# Patient Record
Sex: FEMALE | Race: OTHER
[De-identification: names, ages, dates, MRNs, and addresses within clinical notes are randomized per-mention and may not be internally consistent; named-entity substitution may affect disease eponyms.]

---

## 2017-03-17 ENCOUNTER — HOSPITAL ENCOUNTER (EMERGENCY)
Dept: HOSPITAL 61 - ER | Age: 32
Discharge: HOME | End: 2017-03-17
Payer: COMMERCIAL

## 2017-03-17 VITALS
SYSTOLIC BLOOD PRESSURE: 128 MMHG | DIASTOLIC BLOOD PRESSURE: 92 MMHG | SYSTOLIC BLOOD PRESSURE: 128 MMHG | DIASTOLIC BLOOD PRESSURE: 92 MMHG

## 2017-03-17 VITALS — BODY MASS INDEX: 27.64 KG/M2 | WEIGHT: 156 LBS | HEIGHT: 63 IN

## 2017-03-17 DIAGNOSIS — N76.0: ICD-10-CM

## 2017-03-17 DIAGNOSIS — N30.00: Primary | ICD-10-CM

## 2017-03-17 LAB
BACTERIA #/AREA URNS HPF: (no result) /HPF
BILIRUB UR QL STRIP: NEGATIVE
GLUCOSE UR STRIP-MCNC: NEGATIVE MG/DL
NITRITE UR QL STRIP: NEGATIVE
PH UR STRIP: 6.5 [PH]
PROT UR STRIP-MCNC: 100 MG/DL
RBC #/AREA URNS HPF: (no result) /HPF (ref 0–2)
SP GR UR STRIP: 1.02
SQUAMOUS #/AREA URNS LPF: (no result) /LPF
UROBILINOGEN UR-MCNC: 0.2 MG/DL
WBC #/AREA URNS HPF: (no result) /HPF (ref 0–4)

## 2017-03-17 PROCEDURE — 87491 CHLMYD TRACH DNA AMP PROBE: CPT

## 2017-03-17 PROCEDURE — 87591 N.GONORRHOEAE DNA AMP PROB: CPT

## 2017-03-17 PROCEDURE — 81001 URINALYSIS AUTO W/SCOPE: CPT

## 2017-03-17 PROCEDURE — 99285 EMERGENCY DEPT VISIT HI MDM: CPT

## 2017-03-17 PROCEDURE — 81025 URINE PREGNANCY TEST: CPT

## 2017-03-17 PROCEDURE — 76857 US EXAM PELVIC LIMITED: CPT

## 2017-03-17 NOTE — PHYS DOC
Past Medical History


Past Medical History:  No Pertinent History


Past Surgical History:  , Other


Additional Past Surgical Histo:  BREAST SURG


Alcohol Use:  None


Drug Use:  None





Adult General


Chief Complaint


Chief Complaint:  VAGINAL PROBLEM





[facility]


HPI


Patient is a 32  year old female who presents stating yesterday she noted she 

has extra vaginal tissue hanging around the vagina, patient denies any concern 

for STDs. She states she also noted increased vaginal discharge since 

yesterday. Patient denies any chance she is pregnant. Denies any urgency 

frequency or dysuria. She states she has an appointment with her OB/GYN on 

Thursday next week. 





Patient is Emirati-speaking and interpretation was provided by the 





Review of Systems


Review of Systems





Constitutional: Denies fever or chills []


Eyes: Denies change in visual acuity, redness, or eye pain []


HENT: Denies nasal congestion or sore throat []


Respiratory: Denies cough or shortness of breath []


Cardiovascular: No additional information not addressed in HPI []


GI: Denies abdominal pain, nausea, vomiting, bloody stools or diarrhea []


: extra vaginal tissue hanging around the vagina


Musculoskeletal: Denies back pain or joint pain []


Integument: Denies rash or skin lesions []


Neurologic: Denies headache, focal weakness or sensory changes []


Endocrine: Denies polyuria or polydipsia []





Allergies


Allergies





 Allergies








Coded Allergies Type Severity Reaction Last Updated Verified


 


  No Known Drug Allergies    14 No











Physical Exam


Physical Exam





Constitutional: Well developed, well nourished, no acute distress, non-toxic 

appearance. []


HENT: Normocephalic, atraumatic, bilateral external ears normal, oropharynx 

moist, no oral exudates, nose normal. []


Eyes: PERRLA, EOMI, conjunctiva normal, no discharge. [] 


Neck: Normal range of motion, no tenderness, supple, no stridor. [] 


Cardiovascular:Heart rate regular rhythm, no murmur []


Lungs & Thorax:  Bilateral breath sounds clear to auscultation []


Abdomen: Bowel sounds normal, soft, no tenderness, no masses, no pulsatile 

masses. [] 


Pelvic exam


Lower aspect of the vaginal opening has mild amount of extra tissue with the 

redness, the tissue is not draining anything. The tissue is not warm or tender 

to touch.


Cervix is closed no CMT, no adnexal tenderness, small amount of yellow 

discharge in the vaginal vault. No bleeding


Skin: Warm, dry, no erythema, no rash. [] 


Back: No tenderness, no CVA tenderness. [] 


Extremities: No tenderness, no cyanosis, no clubbing, ROM intact, no edema. [] 


Neurologic: Alert and oriented X 3, normal motor function, normal sensory 

function, no focal deficits noted. []


Psychologic: Affect normal, judgement normal, mood normal. []





Current Patient Data


Vital Signs





 Vital Signs








  Date Time  Temp Pulse Resp B/P Pulse Ox O2 Delivery O2 Flow Rate FiO2


 


3/17/17 11:12 97.5 92 16 128/92 99 Room Air  





 97.5       








Lab Values





 Laboratory Tests








Test


  3/17/17


11:10 3/17/17


12:00


 


POC Urine HCG, Qualitative


  Hcg negative


(Negative) 


 


 


Urine Collection Type  Void  


 


Urine Color  Yellow  


 


Urine Clarity  Turbid  


 


Urine pH  6.5  


 


Urine Specific Gravity  1.025  


 


Urine Protein


  


  100mg/dL


(NEG-TRACE)


 


Urine Glucose (UA)


  


  Negativemg/dL


(NEG)


 


Urine Ketones (Stick)


  


  Negativemg/dL


(NEG)


 


Urine Blood


  


  Moderate (NEG)


 


 


Urine Nitrite


  


  Negative (NEG)


 


 


Urine Bilirubin


  


  Negative (NEG)


 


 


Urine Urobilinogen Dipstick


  


  0.2mg/dL (0.2


mg/dL)


 


Urine Leukocyte Esterase  Large (NEG)  


 


Urine RBC  1-2/HPF (0-2)  


 


Urine WBC


  


  Tntc/HPF (0-4)


 


 


Urine Squamous Epithelial


Cells 


  Many/LPF  


 


 


Urine Bacteria


  


  Many/HPF


(0-FEW)


 


Urine Mucus  Marked/LPF  








Microbiology


3/17/17 Wet Prep - Final, Complete


          








EKG


EKG


[]





Radiology/Procedures


Radiology/Procedures


[]





Course & Med Decision Making


Course & Med Decision Making


Pertinent Labs and Imaging studies reviewed. (See chart for details)





Patient is in the ED complaining of external vaginal tissue that she noted on 

her vaginal wall. On physical exam she does have extra erythematous tissue on 

the lower aspect of the vaginal opening. Wet prep is negative for any acute 

findings. Urine analysis is positive for infection. Discharged with Keflex. She 

has an appointment with Dr. Martinez her OB/GYN on Thursday.





Dragon Disclaimer


Dragon Disclaimer


This electronic medical record was generated, in whole or in part, using a 

voice recognition dictation system.





Departure


Departure


Impression:  


 Primary Impression:  


 Urinary tract infection


 Additional Impression:  


 Vaginal infection


Disposition:   HOME, SELF-CARE


Condition:  STABLE


Referrals:  


NO PCP (PCP)








ART MARTINEZ MD


Follow up on Thursday as scheduled


Patient Instructions:  Skin Infections





Additional Instructions:


You have skin infection on the vaginal wall and urinary tract infection. Please 

complete your antibiotics. See Dr. Rodriguez next Thursday


Scripts


Cephalexin 500 Mg Tablet1 Tab PO BID #14 TAB


   Prov:KELSI MCFARLAND         3/17/17





Problem Qualifiers








 Primary Impression:  


 Urinary tract infection


 Urinary tract infection type:  acute cystitis  Hematuria presence:  without 

hematuria  Qualified Code:  N30.00 - Acute cystitis without hematuria





KELSI MCFARLAND Mar 17, 2017 12:36

## 2019-08-28 ENCOUNTER — HOSPITAL ENCOUNTER (OUTPATIENT)
Dept: HOSPITAL 61 - KCIC US | Age: 34
Discharge: HOME | End: 2019-08-28
Attending: OBSTETRICS & GYNECOLOGY
Payer: COMMERCIAL

## 2019-08-28 DIAGNOSIS — N93.9: Primary | ICD-10-CM

## 2019-08-28 PROCEDURE — 76830 TRANSVAGINAL US NON-OB: CPT

## 2019-08-28 PROCEDURE — 76856 US EXAM PELVIC COMPLETE: CPT

## 2019-08-28 NOTE — KCIC
Examination: Ultrasound pelvis

 

HISTORY: History of abnormal uterine bleeding

 

COMPARISON: 3/17/2017.

 

FINDINGS:

 

The uterus measures 8.4 x 5.6 x 3.4 cm. Endometrium measures 9.7 mm in 

thickness. The right ovary measures 2.6 x 1.8 x 1.4 cm. The left ovary 

measures 2.7 x 1.5 x 1.6 cm. Blood flow identified in the right and left 

ovaries.

 

IMPRESSION:

 

1. Endometrium measures 9.7 mm which is mildly thickened for this stage of

menstrual cycle, given patient's recent LMP.

 

Electronically signed by: Wood Rojas MD (8/28/2019 3:24 PM) [place] [id number]

## 2020-08-05 ENCOUNTER — HOSPITAL ENCOUNTER (EMERGENCY)
Dept: HOSPITAL 61 - ER | Age: 35
Discharge: HOME | End: 2020-08-05
Payer: COMMERCIAL

## 2020-08-05 VITALS — WEIGHT: 154.32 LBS | HEIGHT: 64 IN | BODY MASS INDEX: 26.35 KG/M2

## 2020-08-05 VITALS
SYSTOLIC BLOOD PRESSURE: 108 MMHG | DIASTOLIC BLOOD PRESSURE: 70 MMHG | DIASTOLIC BLOOD PRESSURE: 70 MMHG | SYSTOLIC BLOOD PRESSURE: 108 MMHG

## 2020-08-05 DIAGNOSIS — O20.0: Primary | ICD-10-CM

## 2020-08-05 DIAGNOSIS — Z98.890: ICD-10-CM

## 2020-08-05 DIAGNOSIS — Z3A.01: ICD-10-CM

## 2020-08-05 LAB
ANION GAP SERPL CALC-SCNC: 9 MMOL/L (ref 6–14)
BASOPHILS # BLD AUTO: 0.1 X10^3/UL (ref 0–0.2)
BASOPHILS NFR BLD: 1 % (ref 0–3)
BUN SERPL-MCNC: 10 MG/DL (ref 7–20)
CALCIUM SERPL-MCNC: 8.5 MG/DL (ref 8.5–10.1)
CHLORIDE SERPL-SCNC: 104 MMOL/L (ref 98–107)
CO2 SERPL-SCNC: 24 MMOL/L (ref 21–32)
CREAT SERPL-MCNC: 0.6 MG/DL (ref 0.6–1)
EOSINOPHIL NFR BLD: 0.2 X10^3/UL (ref 0–0.7)
EOSINOPHIL NFR BLD: 3 % (ref 0–3)
ERYTHROCYTE [DISTWIDTH] IN BLOOD BY AUTOMATED COUNT: 13.3 % (ref 11.5–14.5)
GFR SERPLBLD BASED ON 1.73 SQ M-ARVRAT: 114.4 ML/MIN
GLUCOSE SERPL-MCNC: 110 MG/DL (ref 70–99)
HCT VFR BLD CALC: 39.8 % (ref 36–47)
HGB BLD-MCNC: 13.9 G/DL (ref 12–15.5)
LYMPHOCYTES # BLD: 1.9 X10^3/UL (ref 1–4.8)
LYMPHOCYTES NFR BLD AUTO: 27 % (ref 24–48)
MCH RBC QN AUTO: 30 PG (ref 25–35)
MCHC RBC AUTO-ENTMCNC: 35 G/DL (ref 31–37)
MCV RBC AUTO: 86 FL (ref 79–100)
MONO #: 0.4 X10^3/UL (ref 0–1.1)
MONOCYTES NFR BLD: 6 % (ref 0–9)
NEUT #: 4.6 X10^3/UL (ref 1.8–7.7)
NEUTROPHILS NFR BLD AUTO: 64 % (ref 31–73)
PLATELET # BLD AUTO: 327 X10^3/UL (ref 140–400)
POTASSIUM SERPL-SCNC: 3.9 MMOL/L (ref 3.5–5.1)
RBC # BLD AUTO: 4.64 X10^6/UL (ref 3.5–5.4)
SODIUM SERPL-SCNC: 137 MMOL/L (ref 136–145)
WBC # BLD AUTO: 7.2 X10^3/UL (ref 4–11)

## 2020-08-05 PROCEDURE — 36415 COLL VENOUS BLD VENIPUNCTURE: CPT

## 2020-08-05 PROCEDURE — 81025 URINE PREGNANCY TEST: CPT

## 2020-08-05 PROCEDURE — 84702 CHORIONIC GONADOTROPIN TEST: CPT

## 2020-08-05 PROCEDURE — 86901 BLOOD TYPING SEROLOGIC RH(D): CPT

## 2020-08-05 PROCEDURE — 80048 BASIC METABOLIC PNL TOTAL CA: CPT

## 2020-08-05 PROCEDURE — 86900 BLOOD TYPING SEROLOGIC ABO: CPT

## 2020-08-05 PROCEDURE — 85025 COMPLETE CBC W/AUTO DIFF WBC: CPT

## 2020-08-05 PROCEDURE — 76817 TRANSVAGINAL US OBSTETRIC: CPT

## 2020-08-05 PROCEDURE — 99285 EMERGENCY DEPT VISIT HI MDM: CPT

## 2020-08-05 PROCEDURE — 87591 N.GONORRHOEAE DNA AMP PROB: CPT

## 2020-08-05 PROCEDURE — 76801 OB US < 14 WKS SINGLE FETUS: CPT

## 2020-08-05 PROCEDURE — 86850 RBC ANTIBODY SCREEN: CPT

## 2020-08-05 PROCEDURE — 87491 CHLMYD TRACH DNA AMP PROBE: CPT

## 2020-08-05 RX ADMIN — ACETAMINOPHEN ONE MG: 500 TABLET ORAL at 10:14

## 2020-08-05 NOTE — RAD
OB <14 WKS W/TV

 

History: IUP, vaginal bleeding

 

Comparison: None.

 

Findings:

Multiple transabdominal sonographic images of the pelvis are submitted. 

Uterus measures about 6.8 x 3.2 x 4.8 cm.

 

Transvaginal ultrasound: Multiple transvaginal sonographic images of the 

pelvis are submitted. There is a single intrauterine gestational sac with 

identifiable yolk sac and fetal pole. Gestational sac morphology is within

normal limits. There is demonstrable fetal cardiac activity 133 bpm. 

Amniotic fluid volume is within normal limits. Fetal anatomy and placenta 

are not well visualized at this age of the pregnancy. Crown-rump length 

measurement of 0.44 cm corresponds with 6 weeks 1 day. Adjusted ultrasound

age is 6 weeks 1 day with estimated delivery date of 3/30/2021. LMP age is

6 weeks 1 day with estimated delivery date of 3/30/2021.

 

Right ovary measured 3.2 x 2.1 x 2 cm. There is a hypoechoic lesion of the

right ovary about 1.2 cm. There is normal low resistance vascularity of 

the right ovary. Left ovary measured 2.5 x 1 x 0.9 cm, normal low 

resistance vascularity.

 

Impression: 

 

1.  There is a single viable intrauterine pregnancy with demonstrable 

fetal cardiac activity, adjusted ultrasound age 6 weeks 1 day with 

estimated delivery date of 3/30/2021.

2. There is a hypoechoic cyst of the right ovary which may be corpus 

luteal cyst.

 

Electronically signed by: Tre Barry MD (8/5/2020 10:48 AM) [id number]

## 2020-08-05 NOTE — PHYS DOC
Past Medical History


Past Medical History:  No Pertinent History


Past Surgical History:  , Other


Additional Past Surgical Histo:  BREAST SURG


Smoking Status:  Never Smoker


Alcohol Use:  None


Drug Use:  None





General Adult


EDM:


Chief Complaint:  VAGINAL BLEEDING PREGNANCY





HPI:


HPI:





The history was obtained from the patient.  Patient is a 35-year-old  

female with no reported PMH who presents with a chief complaint of vaginal 

bleeding.  Patient states she woke up this morning approximately 3 hours prior 

to arrival noted blood in her underwear.  She states it is slightly less than 

her normal period amount.  She denies any clot passage.  She denies any abdomina

l pain.  She denies any lightheadedness or syncope.  She states that she has not

had prenatal care to this point.  She states the first day of her last menstrual

period was .  She estimates she is 7 weeks pregnant.  She states that she

actually scheduled an appointment with OB/GYN at 2:00 this afternoon but wanted 

to be seen sooner.  Denies urinary symptoms.  Denies any other vaginal 

discharge.  Denies history of ectopic pregnancy.  No other complaints.





Review of Systems:


Review of Systems:


Constitutional:   Denies fever or chills. []


Eyes:   Denies change in visual acuity. []


HENT:   Denies nasal congestion or sore throat. [] 


Respiratory:   Denies cough or shortness of breath. [] 


Cardiovascular:   Denies chest pain or edema. [] 


GI:   Denies abdominal pain, nausea, vomiting, bloody stools or diarrhea. [] 


: Positive for vaginal bleeding


Musculoskeletal:   Denies back pain or joint pain. [] 


Integument:   Denies rash. [] 


Neurologic:   Denies headache, focal weakness or sensory changes. [] 


Endocrine:   Denies polyuria or polydipsia. [] 


Lymphatic:  Denies swollen glands. [] 


Psychiatric:  Denies depression or anxiety. []





Heart Score:


Risk Factors:


Risk Factors:  DM, Current or recent (<one month) smoker, HTN, HLP, family 

history of CAD, obesity.


Risk Scores:


Score 0 - 3:  2.5% MACE over next 6 weeks - Discharge Home


Score 4 - 6:  20.3% MACE over next 6 weeks - Admit for Clinical Observation


Score 7 - 10:  72.7% MACE over next 6 weeks - Early Invasive Strategies





Current Medications:





Current Medications








 Medications


  (Trade)  Dose


 Ordered  Sig/Eri  Start Time


 Stop Time Status Last Admin


Dose Admin


 


 Acetaminophen


  (Tylenol)  1,000 mg  1X  ONCE  20 09:45


 20 09:46 DC  














Allergies:


Allergies:





Allergies








Coded Allergies Type Severity Reaction Last Updated Verified


 


  No Known Drug Allergies    14 No











Physical Exam:


PE:





Constitutional: Well developed, well nourished, no acute distress, non-toxic 

appearance. []


HENT: Normocephalic, atraumatic, bilateral external ears normal, oropharynx 

moist, no oral exudates, nose normal. []


Eyes: PERRLA, EOMI, conjunctiva normal, no discharge. [] 


Neck: Normal range of motion, no tenderness, supple, no stridor. [] 


Cardiovascular:Heart rate regular rhythm, no murmur []


Lungs & Thorax:  Bilateral breath sounds clear to auscultation []


Abdomen: Soft, nontender, nonacute abdomen.  No involuntary guarding or rigidity

 noted.  No acute peritonitis.


Pelvic: Chaperoned by GWYN Alcala.  Scant amount of mucousy bloody discharge.  

No active bleeding noted.  No fetal parts visualized.  No clots noted.


Skin: Warm, dry, no erythema, no rash. [] 


Back: No tenderness, no CVA tenderness. [] 


Extremities: No tenderness, no cyanosis, no clubbing, ROM intact, no edema. [] 


Neurologic: Alert and oriented X 3, normal motor function, normal sensory 

function, no focal deficits noted. []


Psychologic: Affect normal, judgement normal, mood normal. []





Current Patient Data:


Labs:





Laboratory Tests








Test


 20


09:37 20


09:49


 


Bedside Urine HCG, Qualitative Hcg positive  


 


White Blood Count  7.2 x10^3/uL 


 


Red Blood Count  4.64 x10^6/uL 


 


Hemoglobin  13.9 g/dL 


 


Hematocrit  39.8 % 


 


Mean Corpuscular Volume  86 fL 


 


Mean Corpuscular Hemoglobin  30 pg 


 


Mean Corpuscular Hemoglobin


Concent 


 35 g/dL 





 


Red Cell Distribution Width  13.3 % 


 


Platelet Count  327 x10^3/uL 


 


Neutrophils (%) (Auto)  64 % 


 


Lymphocytes (%) (Auto)  27 % 


 


Monocytes (%) (Auto)  6 % 


 


Eosinophils (%) (Auto)  3 % 


 


Basophils (%) (Auto)  1 % 


 


Neutrophils # (Auto)  4.6 x10^3/uL 


 


Lymphocytes # (Auto)  1.9 x10^3/uL 


 


Monocytes # (Auto)  0.4 x10^3/uL 


 


Eosinophils # (Auto)  0.2 x10^3/uL 


 


Basophils # (Auto)  0.1 x10^3/uL 


 


Maternal Serum HCG Beta


Subunit 


 34789 mIU/mL 





 


Sodium Level  137 mmol/L 


 


Potassium Level  3.9 mmol/L 


 


Chloride Level  104 mmol/L 


 


Carbon Dioxide Level  24 mmol/L 


 


Anion Gap  9 


 


Blood Urea Nitrogen  10 mg/dL 


 


Creatinine  0.6 mg/dL 


 


Estimated GFR


(Cockcroft-Gault) 


 114.4 





 


Glucose Level  110 mg/dL 


 


Calcium Level  8.5 mg/dL 








Current Medications








 Medications


  (Trade)  Dose


 Ordered  Sig/Eri


 Route


 PRN Reason  Start Time


 Stop Time Status Last Admin


Dose Admin


 


 Acetaminophen


  (Tylenol)  1,000 mg  1X  ONCE


 PO


   20 09:45


 20 09:46 DC 20 10:14











                                Laboratory Tests








Test


 20


09:37


 


POC Urine HCG, Qualitative


 Hcg positive


(Negative)











EKG:


EKG:


[]





Radiology/Procedures:


Radiology/Procedures:


[][facility]


                    [address] [place] [address]


                                 [phone]


                                        


                                 IMAGING REPORT





                                     Signed





PATIENT: CARA ALFARO AACCOUNT: [id number]     MRN#: [id number]


: 1985           LOCATION: ER              AGE: 35


SEX: F                    EXAM DT: 20         ACCESSION#: [id number]


STATUS: REG ER            ORD. PHYSICIAN: DIMA PANCHAL DO


REASON: confirm IUP. vag bleed


PROCEDURE: OB <14 WKS W/TV





OB <14 WKS W/TV


 


History: IUP, vaginal bleeding


 


Comparison: None.


 


Findings:


Multiple transabdominal sonographic images of the pelvis are submitted. 


Uterus measures about 6.8 x 3.2 x 4.8 cm.


 


Transvaginal ultrasound: Multiple transvaginal sonographic images of the 


pelvis are submitted. There is a single intrauterine gestational sac with 


identifiable yolk sac and fetal pole. Gestational sac morphology is within


normal limits. There is demonstrable fetal cardiac activity 133 bpm. 


Amniotic fluid volume is within normal limits. Fetal anatomy and placenta 


are not well visualized at this age of the pregnancy. Crown-rump length 


measurement of 0.44 cm corresponds with 6 weeks 1 day. Adjusted ultrasound


age is 6 weeks 1 day with estimated delivery date of 3/30/2021. LMP age is


6 weeks 1 day with estimated delivery date of 3/30/2021.


 


Right ovary measured 3.2 x 2.1 x 2 cm. There is a hypoechoic lesion of the


right ovary about 1.2 cm. There is normal low resistance vascularity of 


the right ovary. Left ovary measured 2.5 x 1 x 0.9 cm, normal low 


resistance vascularity.


 


Impression: 


 


1.  There is a single viable intrauterine pregnancy with demonstrable 


fetal cardiac activity, adjusted ultrasound age 6 weeks 1 day with 


estimated delivery date of 3/30/2021.


2. There is a hypoechoic cyst of the right ovary which may be corpus 


luteal cyst.


 


Electronically signed by: Fransisco Aquino MD (2020 10:48 AM) [id number]














DICTATED and SIGNED BY:     FRANSISCO AQUINO MD


DATE:     20 1048





Course & Med Decision Making:


Course & Med Decision Making


Pertinent Labs and Imaging studies reviewed. (See chart for details)





Patient is a 35-year-old G3, P2 female who presents with chief complaint of 

vaginal burning associated with pregnancy.  Ultrasound does reveal intrauterine 

pregnancy.  Estimated gestational age 6 weeks 1 day.  Fetal heart tones noted.  

Blood type B+ therefore RhoGam be deferred.  Remainder of work-up unremarkable. 

 I did discuss results of labs and imaging with the patient and father at 

bedside.  Patient has had appoint with her OB/GYN in 3 hours.  I do feel she is 

appropriate for discharge home.  Return precautions discussed and understood.  

Stable for discharge.





Dragon Disclaimer:


Dragon Disclaimer:


This electronic medical record was generated, in whole or in part, using a voice

 recognition dictation system.





Departure


Departure


Impression:  


   Primary Impression:  


   Threatened 


Disposition:  01 HOME, SELF-CARE


Condition:  GOOD


Referrals:  


NO PCP (PCP)


Patient Instructions:  Threatened Miscarriage





Additional Instructions:  


Please follow-up with your scheduled OB/GYN appointment in the next 3 hours.





Justicifation of Admission Dx:


Justifications for Admission:


Justification of Admission Dx:  N/A











DIMA PANCHAL DO           Aug 5, 2020 09:50

## 2021-01-13 ENCOUNTER — HOSPITAL ENCOUNTER (OUTPATIENT)
Dept: HOSPITAL 61 - LAB | Age: 36
End: 2021-01-13
Attending: OBSTETRICS & GYNECOLOGY
Payer: MEDICAID

## 2021-01-13 DIAGNOSIS — Z3A.27: ICD-10-CM

## 2021-01-13 DIAGNOSIS — O09.92: Primary | ICD-10-CM

## 2021-01-13 PROCEDURE — 82947 ASSAY GLUCOSE BLOOD QUANT: CPT

## 2021-01-13 PROCEDURE — 82950 GLUCOSE TEST: CPT

## 2021-01-13 PROCEDURE — 36415 COLL VENOUS BLD VENIPUNCTURE: CPT

## 2021-11-24 ENCOUNTER — HOSPITAL ENCOUNTER (EMERGENCY)
Dept: HOSPITAL 61 - ER | Age: 36
Discharge: HOME | End: 2021-11-24
Payer: MEDICAID

## 2021-11-24 VITALS — BODY MASS INDEX: 27.18 KG/M2 | WEIGHT: 147.71 LBS | HEIGHT: 62 IN

## 2021-11-24 VITALS — SYSTOLIC BLOOD PRESSURE: 121 MMHG | DIASTOLIC BLOOD PRESSURE: 81 MMHG

## 2021-11-24 DIAGNOSIS — Z98.890: ICD-10-CM

## 2021-11-24 DIAGNOSIS — M54.50: Primary | ICD-10-CM

## 2021-11-24 LAB
APTT PPP: YELLOW S
BACTERIA #/AREA URNS HPF: (no result) /HPF
BILIRUB UR QL STRIP: NEGATIVE
FIBRINOGEN PPP-MCNC: CLEAR MG/DL
NITRITE UR QL STRIP: NEGATIVE
PH UR STRIP: 7.5 [PH]
PROT UR STRIP-MCNC: NEGATIVE MG/DL
RBC #/AREA URNS HPF: (no result) /HPF (ref 0–2)
UROBILINOGEN UR-MCNC: 1 MG/DL
WBC #/AREA URNS HPF: (no result) /HPF (ref 0–4)

## 2021-11-24 PROCEDURE — 81025 URINE PREGNANCY TEST: CPT

## 2021-11-24 PROCEDURE — 81001 URINALYSIS AUTO W/SCOPE: CPT

## 2021-11-24 PROCEDURE — 99285 EMERGENCY DEPT VISIT HI MDM: CPT

## 2021-11-24 NOTE — PHYS DOC
Past Medical History


Past Medical History:  No Pertinent History


 (ANDREW MORGAN)


Past Surgical History:  , Other


Additional Past Surgical Histo:  BREAST SURG


 (ANDREW MORGAN)


Smoking Status:  Never Smoker


Alcohol Use:  None


Drug Use:  None


 (ANDREW MORGAN)





General Adult


EDM:


Chief Complaint:  BACK PAIN - NO INJURY





HPI:


HPI:





Patient is a 36-year-old female that presents today with low back pain.  Patient

is Portuguese-speaking only so interpretive services have been used for this exam. 

Patient states her pain started 3 days ago, she denies any trauma.  Patient 

states that she had the same pain about 2 years ago and was diagnosed with 

kidney or bladder infection, and was treated, she was told also to follow-up 

with a kidney specialist she stated that her pain went away and she never 

followed up after that.  Patient is sitting crosslegged on the bed in no acute 

distress.  Patient denies fever or chills, vaginal bleeding vaginal discharge.  

Patient states that her last menstrual cycle was approximately 2 weeks ago she 

states it was normal for her except for she did have some increased cramping at 

the start of her period. 


 (ANDREW MORGAN APRANA LUISA)





Review of Systems:


Review of Systems:


Constitutional:   Denies fever or chills. []


Eyes:   Denies change in visual acuity. []


HENT:   Denies nasal congestion or sore throat. [] 


Respiratory:   Denies cough or shortness of breath. [] 


Cardiovascular:   Denies chest pain or edema. [] 


GI:   Denies abdominal pain, nausea, vomiting, bloody stools or diarrhea. [] 


:  Denies dysuria. [] 


Musculoskeletal: Low back pain


Integument:   Denies rash. [] 


Neurologic:   Denies headache, focal weakness or sensory changes. [] 


Endocrine:   Denies polyuria or polydipsia. [] 


Lymphatic:  Denies swollen glands. [] 


Psychiatric:  Denies depression or anxiety. []


 (ANDREW MORGAN APRANA LUISA)





Heart Score:


C/O Chest Pain:  N/A


Risk Factors:


Risk Factors:  DM, Current or recent (<one month) smoker, HTN, HLP, family 

history of CAD, obesity.


Risk Scores:


Score 0 - 3:  2.5% MACE over next 6 weeks - Discharge Home


Score 4 - 6:  20.3% MACE over next 6 weeks - Admit for Clinical Observation


Score 7 - 10:  72.7% MACE over next 6 weeks - Early Invasive Strategies


 (ANDREW MORGAN)





Allergies:


Allergies:





Allergies








Coded Allergies Type Severity Reaction Last Updated Verified


 


  No Known Drug Allergies    14 No








 (ANDREW MORGAN)





Physical Exam:


PE:





Constitutional: Well developed, well nourished, no acute distress, non-toxic a

ppearance. []


HENT: Normocephalic, atraumatic, bilateral external ears normal, oropharynx 

moist, no oral exudates, nose normal. []


Eyes: PERRLA, EOMI, conjunctiva normal, no discharge. [] 


Neck: Normal range of motion, no tenderness, supple, no stridor. [] 


Cardiovascular:Heart rate regular rhythm, no murmur []


Lungs & Thorax:  Bilateral breath sounds clear to auscultation []


Abdomen: Bowel sounds normal, soft, no tenderness, no masses, no pulsatile 

masses. [] 


Skin: Warm, dry, no erythema, no rash. [] 


Back: Low back pain with tenderness with palpation, no radiculopathy 

neurovascular intact distal to the pain, patient ambulates without difficulty


Extremities: No tenderness, no cyanosis, no clubbing, ROM intact, no edema. [] 


Neurologic: Alert and oriented X 3, normal motor function, normal sensory func

tion, no focal deficits noted. []


Psychologic: Affect normal, judgement normal, mood normal. []


 (ANDREW MORGAN APRANA LUISA)





Current Patient Data:


Labs:





                                Laboratory Tests








Test


 21


20:54 21


20:56


 


POC Urine HCG, Qualitative


 Hcg negative


(Negative) 





 


Urine Collection Type  Unknown  


 


Urine Color  Yellow  


 


Urine Clarity  Clear  


 


Urine pH


 


 7.5 (<5.0-8.0)





 


Urine Specific Gravity


 


 1.015


(1.000-1.030)


 


Urine Protein


 


 Negative mg/dL


(NEG-TRACE)


 


Urine Glucose (UA)


 


 Negative mg/dL


(NEG)


 


Urine Ketones (Stick)


 


 Negative mg/dL


(NEG)


 


Urine Blood


 


 Negative (NEG)





 


Urine Nitrite


 


 Negative (NEG)





 


Urine Bilirubin


 


 Negative (NEG)





 


Urine Urobilinogen Dipstick


 


 1.0 mg/dL (0.2


mg/dL)


 


Urine Leukocyte Esterase


 


 Negative (NEG)





 


Urine RBC


 


 Occ /HPF (0-2)





 


Urine WBC


 


 1-4 /HPF (0-4)





 


Urine Squamous Epithelial


Cells 


 Mod /LPF  





 


Urine Transitional Epithelial


Cells 


 Occ /LPF  





 


Urine Bacteria


 


 Few /HPF


(0-FEW)


 


Urine Mucus  Slight /LPF  








Vital Signs:





                                   Vital Signs








  Date Time  Temp Pulse Resp B/P (MAP) Pulse Ox O2 Delivery O2 Flow Rate FiO2


 


21 21:14 98.3 102 16 140/91 (107) 99 Room Air  





 98.3       








 (ANDREW MORGAN)





EKG:


EKG:


[]


 (ANDREW MORGAN)





Radiology/Procedures:


Radiology/Procedures:


[]


 (ANDREW MORGAN)





Course & Med Decision Making:


Course & Med Decision Making


Pertinent Labs and Imaging studies reviewed. (See chart for details)





 conferred with Dr. Maldonado regarding patient's symptoms and urinalysis.  At 

this time with the lack of leukoesterase and lack of nitrates in her urine will 

not treat for urinary tract infection.  We will send patient home with Motrin 

for pain.  Will encourage her to follow-up with one of the clinics located on 

the brochure or her primary care physician for further follow-up if not any 

better in the next 3 to 5 days.  Encourage patient to return if pain increases, 

she develops a fever or chills, or painful urination. 


 (ANDREW MORGAN)


Course & Med Decision Making


Patients Care and treatment plan provided by ER Nurse Practitioner.  I was 

available for consult.  Patient's chart reviewed.


 (ISAAC MALDONADO DO)


Dragon Disclaimer:


Dragon Disclaimer:


This electronic medical record was generated, in whole or in part, using a voice

 recognition dictation system.


 (ANDREW MORGAN)





Departure


Departure


Impression:  


   Primary Impression:  


   Low back pain


   Qualified Codes:  M54.50 - Low back pain, unspecified


Disposition:   HOME / SELF CARE / HOMELESS


Condition:  STABLE


Referrals:  


NO PCP (PCP)


Patient Instructions:  Back Pain, Adult





Additional Instructions:  


Take Motrin 600 mg every 6 hours as needed for pain


Take Flexeril 10 mg every 8 hours as needed for muscle spasms or pain, use with 

caution due to this medication may cause drowsiness


Return to the emergency department if you develop increased back pain or lower 

abdominal pain, you develop fever and chills, you have increased pain or 

frequency in urination or you notice blood in your urine


Follow-up with your primary care physician or one of the clinic on the brochure 

provided for further follow-up


Scripts


Ibuprofen (IBUPROFEN) 600 Mg Tablet


600 MG PO PRN Q6HRS PRN for INFLAMMATION, #30 TAB


   Prov: ANDREW MORGAN         21











ANDREW MORGAN       2021 22:15


ISAAC MALDONADO DO            2021 19:34